# Patient Record
Sex: FEMALE | Race: BLACK OR AFRICAN AMERICAN | NOT HISPANIC OR LATINO | URBAN - METROPOLITAN AREA
[De-identification: names, ages, dates, MRNs, and addresses within clinical notes are randomized per-mention and may not be internally consistent; named-entity substitution may affect disease eponyms.]

---

## 2018-07-15 ENCOUNTER — EMERGENCY (EMERGENCY)
Facility: HOSPITAL | Age: 23
LOS: 1 days | Discharge: HOME | End: 2018-07-15
Attending: EMERGENCY MEDICINE | Admitting: EMERGENCY MEDICINE
Payer: MEDICAID

## 2018-07-15 VITALS
OXYGEN SATURATION: 100 % | TEMPERATURE: 98 F | HEART RATE: 88 BPM | DIASTOLIC BLOOD PRESSURE: 93 MMHG | RESPIRATION RATE: 16 BRPM | SYSTOLIC BLOOD PRESSURE: 136 MMHG

## 2018-07-15 PROCEDURE — 99283 EMERGENCY DEPT VISIT LOW MDM: CPT | Mod: 25

## 2018-07-15 RX ORDER — DEXAMETHASONE 0.5 MG/5ML
10 ELIXIR ORAL ONCE
Qty: 0 | Refills: 0 | Status: COMPLETED | OUTPATIENT
Start: 2018-07-15 | End: 2018-07-15

## 2018-07-15 RX ORDER — AMOXICILLIN 250 MG/5ML
1 SUSPENSION, RECONSTITUTED, ORAL (ML) ORAL
Qty: 14 | Refills: 0
Start: 2018-07-15 | End: 2018-07-21

## 2018-07-15 RX ORDER — AMOXICILLIN 250 MG/5ML
500 SUSPENSION, RECONSTITUTED, ORAL (ML) ORAL ONCE
Qty: 0 | Refills: 0 | Status: COMPLETED | OUTPATIENT
Start: 2018-07-15 | End: 2018-07-15

## 2018-07-15 RX ADMIN — Medication 10 MILLIGRAM(S): at 05:39

## 2018-07-15 RX ADMIN — Medication 500 MILLIGRAM(S): at 05:39

## 2018-07-15 NOTE — ED PROVIDER NOTE - PLAN OF CARE
Take medications as prescribed. If no improvement in 3 days, follow up with your healthcare provider about this issue (these issues): tonsillitis. Return if unable to swallow and tolerate liquids.

## 2018-07-15 NOTE — ED PROVIDER NOTE - CARE PLAN
Principal Discharge DX:	Tonsillitis Principal Discharge DX:	Tonsillitis  Assessment and plan of treatment:	Take medications as prescribed. If no improvement in 3 days, follow up with your healthcare provider about this issue (these issues): tonsillitis. Return if unable to swallow and tolerate liquids.

## 2018-07-15 NOTE — ED PROVIDER NOTE - ATTENDING CONTRIBUTION TO CARE
I performed a face-to-face evaluation of the patient and performed a history and physical examination. I agree with the history and physical examination.    Gary: Exudative tonsillitis. Steroids. PCN.

## 2018-07-15 NOTE — ED ADULT TRIAGE NOTE - CHIEF COMPLAINT QUOTE
Patient is having difficulty swallowing due sore throat, productive cough and decrease PO intake.  No fever or chills.  Experiencing lower back pain x 2 days.  No past medical history.

## 2018-07-15 NOTE — ED PROVIDER NOTE - MEDICAL DECISION MAKING DETAILS
Gary: Exudative tonsillitis. Steroids. PCN. Gary: Exudative tonsillitis. No STD risks. Steroids. PCN.

## 2018-07-15 NOTE — ED PROVIDER NOTE - OBJECTIVE STATEMENT
23F no PMH p/w sore throat x 4 days, hurts when swallowing. +cough. Denies nausea, vomiting, fever, dysuria, hematuria, sick contacts, myalgias.

## 2019-11-30 ENCOUNTER — TRANSCRIPTION ENCOUNTER (OUTPATIENT)
Age: 24
End: 2019-11-30

## 2020-05-18 ENCOUNTER — TRANSCRIPTION ENCOUNTER (OUTPATIENT)
Age: 25
End: 2020-05-18

## 2020-06-21 ENCOUNTER — TRANSCRIPTION ENCOUNTER (OUTPATIENT)
Age: 25
End: 2020-06-21

## 2020-08-17 ENCOUNTER — TRANSCRIPTION ENCOUNTER (OUTPATIENT)
Age: 25
End: 2020-08-17

## 2021-06-02 ENCOUNTER — EMERGENCY (EMERGENCY)
Facility: HOSPITAL | Age: 26
LOS: 0 days | Discharge: HOME | End: 2021-06-02
Payer: OTHER MISCELLANEOUS

## 2021-06-02 VITALS
DIASTOLIC BLOOD PRESSURE: 89 MMHG | HEART RATE: 81 BPM | OXYGEN SATURATION: 100 % | SYSTOLIC BLOOD PRESSURE: 137 MMHG | HEIGHT: 59 IN | RESPIRATION RATE: 18 BRPM | WEIGHT: 179.9 LBS | TEMPERATURE: 99 F

## 2021-06-02 VITALS
SYSTOLIC BLOOD PRESSURE: 135 MMHG | RESPIRATION RATE: 17 BRPM | HEART RATE: 81 BPM | DIASTOLIC BLOOD PRESSURE: 86 MMHG | OXYGEN SATURATION: 100 %
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
## 2021-06-02 DIAGNOSIS — S90.932A UNSPECIFIED SUPERFICIAL INJURY OF LEFT GREAT TOE, INITIAL ENCOUNTER: ICD-10-CM

## 2021-06-02 DIAGNOSIS — S91.312A LACERATION WITHOUT FOREIGN BODY, LEFT FOOT, INITIAL ENCOUNTER: ICD-10-CM
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
## 2021-06-02 DIAGNOSIS — Z23 ENCOUNTER FOR IMMUNIZATION: ICD-10-CM

## 2021-06-02 PROCEDURE — 73630 X-RAY EXAM OF FOOT: CPT | Mod: 26,LT

## 2021-06-02 PROCEDURE — 99283 EMERGENCY DEPT VISIT LOW MDM: CPT

## 2021-06-02 RX ORDER — OXYCODONE AND ACETAMINOPHEN 5; 325 MG/1; MG/1
1 TABLET ORAL ONCE
Refills: 0 | Status: DISCONTINUED | OUTPATIENT
Start: 2021-06-02 | End: 2021-06-02

## 2021-06-02 RX ORDER — IBUPROFEN 200 MG
1 TABLET ORAL
Qty: 12 | Refills: 0
Start: 2021-06-02 | End: 2021-06-05

## 2021-06-02 RX ORDER — TETANUS TOXOID, REDUCED DIPHTHERIA TOXOID AND ACELLULAR PERTUSSIS VACCINE, ADSORBED 5; 2.5; 8; 8; 2.5 [IU]/.5ML; [IU]/.5ML; UG/.5ML; UG/.5ML; UG/.5ML
0.5 SUSPENSION INTRAMUSCULAR ONCE
Refills: 0 | Status: COMPLETED | OUTPATIENT
Start: 2021-06-02 | End: 2021-06-02

## 2021-06-02 RX ADMIN — OXYCODONE AND ACETAMINOPHEN 1 TABLET(S): 5; 325 TABLET ORAL at 21:14

## 2021-06-02 RX ADMIN — Medication 1 TABLET(S): at 19:18

## 2021-06-02 RX ADMIN — TETANUS TOXOID, REDUCED DIPHTHERIA TOXOID AND ACELLULAR PERTUSSIS VACCINE, ADSORBED 0.5 MILLILITER(S): 5; 2.5; 8; 8; 2.5 SUSPENSION INTRAMUSCULAR at 19:19

## 2021-06-02 NOTE — ED PROVIDER NOTE - CARE PROVIDER_API CALL
Conchis Oseguera  FOOT AND ANKLE SURGERY  [address], [address]  [place] [address]  Phone: [phone]  Fax: [phone]  Follow Up Time:

## 2021-06-02 NOTE — CONSULT NOTE ADULT - SUBJECTIVE AND OBJECTIVE BOX
Podiatry pager #: [phone]/ [phone]    Patient is a 25y old  Female who presents with a chief complaint of LF pain.    HPI:    25F here with toe injury after dropping a dresser on her toe/foot today. She reports laceration and pain to the area. Last tetanus is unknown. She has been ambulatory.    PAST MEDICAL & SURGICAL HISTORY:  No pertinent past medical history    No significant past surgical history        MEDICATIONS  (STANDING):    MEDICATIONS  (PRN):      Allergies    No Known Drug Allergies  strawberry (Anaphylaxis)    Intolerances        VITALS:    Vital Signs Last 24 Hrs  T(C): 37.1 (02 Jun 2021 16:40), Max: 37.1 (02 Jun 2021 16:40)  T(F): 98.7 (02 Jun 2021 16:40), Max: 98.7 (02 Jun 2021 16:40)  HR: 81 (02 Jun 2021 16:40) (81 - 81)  BP: 137/89 (02 Jun 2021 16:40) (137/89 - 137/89)  BP(mean): --  RR: 18 (02 Jun 2021 16:40) (18 - 18)  SpO2: 100% (02 Jun 2021 16:40) (100% - 100%)    LABS:                CAPILLARY BLOOD GLUCOSE              LOWER EXTREMITY PHYSICAL EXAM:    Vasular: DP/PT 2/4, B/L, CFT <3 seconds B/L, Temperature gradient WNL, B/L.   Neuro: Epicritic sensation intact to the level of digits, B/L.  Musculoskeletal/Ortho: Left foot second digit laceration down to bone at the distal portion of digit, pain with palpation, minimal sanguinous drainage, no obvious contaminate.     Skin: laceration of left 2nd toe    RADIOLOGY & ADDITIONAL STUDIES:

## 2021-06-02 NOTE — CONSULT NOTE ADULT - ASSESSMENT
Pt is 26yo who presents w/ left 2nd toe open fracture  -pt was seen and examined  -Left foot second digit laceration down to bone at the distal portion of digit, pain with palpation, minimal sanguinous drainage, no obvious contaminate.   -LF xray- crush fracture to LF 2nd DP  -5cc of 1% lidocaine plain used to anesthesise the left 2nd digit. LF laceration thoroughly flushed w/ saline. Primary repair completed using 4-0 nylon  -Recommend 1 week of Augmentin  -To WBAT in a surgical shoe to the left heel  -Keep dressing clean dry and intact until follow up w/ Podiatrist within the week  -Please follow up w/ a podiatrist at [phone]  -Discussed w/ attending

## 2021-06-02 NOTE — ED PROVIDER NOTE - PATIENT PORTAL LINK FT
You can access the FollowMyHealth Patient Portal offered by [facility] by registering at the following website: [email or web address]. By joining [employer]’s FollowMyHealth portal, you will also be able to view your health information using other applications (apps) compatible with our system.

## 2021-06-02 NOTE — ED ADULT NURSE NOTE - NSIMPLEMENTINTERV_GEN_ALL_ED
Implemented All Universal Safety Interventions:  [place] to call system. Call bell, personal items and telephone within reach. Instruct patient to call for assistance. Room bathroom lighting operational. Non-slip footwear when patient is off stretcher. Physically safe environment: no spills, clutter or unnecessary equipment. Stretcher in lowest position, wheels locked, appropriate side rails in place.

## 2021-06-02 NOTE — ED PROVIDER NOTE - OBJECTIVE STATEMENT
25F here with toe injury after dropping a dresser on her toe/foot today. She reports laceration and pain to the area. Last tetanus is unknown. She has been ambulatory.

## 2021-06-02 NOTE — ED PROVIDER NOTE - CLINICAL SUMMARY MEDICAL DECISION MAKING FREE TEXT BOX
Patient presents with toe and nail laceration, recommend xray, tetanus, antibiotics, podiatry evaluation

## 2021-06-06 ENCOUNTER — EMERGENCY (EMERGENCY)
Facility: HOSPITAL | Age: 26
LOS: 0 days | Discharge: HOME | End: 2021-06-06
Payer: OTHER MISCELLANEOUS

## 2021-06-06 VITALS
OXYGEN SATURATION: 98 % | SYSTOLIC BLOOD PRESSURE: 122 MMHG | WEIGHT: 179.9 LBS | TEMPERATURE: 98 F | HEIGHT: 59 IN | DIASTOLIC BLOOD PRESSURE: 81 MMHG | HEART RATE: 81 BPM | RESPIRATION RATE: 17 BRPM

## 2021-06-06 DIAGNOSIS — M79.675 PAIN IN LEFT TOE(S): ICD-10-CM
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
## 2021-06-06 DIAGNOSIS — Z88.8 ALLERGY STATUS TO OTHER DRUGS, MEDICAMENTS AND BIOLOGICAL SUBSTANCES: ICD-10-CM

## 2021-06-06 DIAGNOSIS — T14.90XA INJURY, UNSPECIFIED, INITIAL ENCOUNTER: ICD-10-CM

## 2021-06-06 DIAGNOSIS — M79.672 PAIN IN LEFT FOOT: ICD-10-CM

## 2021-06-06 PROCEDURE — 99283 EMERGENCY DEPT VISIT LOW MDM: CPT

## 2021-06-06 RX ORDER — ACETAMINOPHEN 500 MG
650 TABLET ORAL ONCE
Refills: 0 | Status: COMPLETED | OUTPATIENT
Start: 2021-06-06 | End: 2021-06-06

## 2021-06-06 RX ORDER — BACITRACIN ZINC 500 UNIT/G
1 OINTMENT IN PACKET (EA) TOPICAL ONCE
Refills: 0 | Status: COMPLETED | OUTPATIENT
Start: 2021-06-06 | End: 2021-06-06

## 2021-06-06 RX ADMIN — Medication 650 MILLIGRAM(S): at 12:17

## 2021-06-06 RX ADMIN — Medication 1 APPLICATION(S): at 13:55

## 2021-06-06 NOTE — ED PROVIDER NOTE - PHYSICAL EXAMINATION
left toe 4th distal tip laceration with minimal localised swelling, TTP left 4th distal toe, no erythema, no signs of infection. dry not warm to touch. no  FROM, no sensory deficit, distal pulse intact, cap refill < 2 second, strength 5/5

## 2021-06-06 NOTE — ED ADULT NURSE NOTE - NSIMPLEMENTINTERV_GEN_ALL_ED
Implemented All Universal Safety Interventions:  [place] to call system. Call bell, personal items and telephone within reach. Instruct patient to call for assistance. Room bathroom lighting operational. Non-slip footwear when patient is off stretcher. Physically safe environment: no spills, clutter or unnecessary equipment. Stretcher in lowest position, wheels locked, appropriate side rails in place.

## 2021-06-06 NOTE — ED PROVIDER NOTE - CCCP TRG CHIEF CMPLNT
foot pain/injury Arava Counseling:  Patient counseled regarding adverse effects of Arava including but not limited to nausea, vomiting, abnormalities in liver function tests. Patients may develop mouth sores, rash, diarrhea, and abnormalities in blood counts. The patient understands that monitoring is required including LFTs and blood counts.  There is a rare possibility of scarring of the liver and lung problems that can occur when taking methotrexate. Persistent nausea, loss of appetite, pale stools, dark urine, cough, and shortness of breath should be reported immediately. Patient advised to discontinue Arava treatment and consult with a physician prior to attempting conception. The patient will have to undergo a treatment to eliminate Arava from the body prior to conception.

## 2021-06-06 NOTE — ED PROVIDER NOTE - NSFOLLOWUPCLINICS_GEN_ALL_ED_FT
[facility]  Podiatry  [address] [place] [address] - [address] [place] [address]  Phone: [phone]  Fax:   Follow Up Time: 7-10 Days

## 2021-06-06 NOTE — ED PROVIDER NOTE - NSFOLLOWUPINSTRUCTIONS_ED_ALL_ED_FT
Please schedule to follow up with podiatry for toe pain, toe fracture and laceration     [facility]  Podiatry  [address] [place] [address] [address] [place] [address]  Phone: [phone]  Fax:   Follow Up Time: 7-10 Days    please take tylenol 325 mg every 4 hours as needed for pain     please return to the ED for worsening symptoms chest pain, short of breath, fever, chills, cough, hemoptysis, numbness, weakness, drooling, blurry vision, headache, dizziness, bleeding, dysuria, frequency, urgency, rash, lesion, fall, injury, trauma, paresthesia, swelling, pain, discoloration,

## 2021-06-06 NOTE — ED ADULT NURSE NOTE - OBJECTIVE STATEMENT
patient c/o 8/ 1o left 2nd toe pain, sutures in place s/ p injury last week. Patient requesting extension of her work note until she is able to see the orthopedic.

## 2021-06-06 NOTE — ED PROVIDER NOTE - PROGRESS NOTE DETAILS
AAox3 non toxic looking afebrile sitting in chair in NAD. patient remains asymptomatic. bacitracin applied to toe, clean gauze applied, ace wrap place, podiatry shoe placed. patient instructed to keep schedule appointment with podiatry on 6/14/21 for toe fracture and laceration

## 2021-06-06 NOTE — ED PROVIDER NOTE - HIV OFFER
Refill requested:   Requested Prescriptions     Pending Prescriptions Disp Refills   • Blood Glucose Monitoring Suppl (TRUE METRIX METER) Does not apply Device 1 Device 0     Si Device by Other route 2 (two) times daily.  Dx E11.65   • TRUEPLUS LANCETS [facility]  [address] [facility]  [facility]  [address]  [address] [place] [employer], [employer] Previously Declined (within the last year)

## 2021-06-06 NOTE — ED ADULT TRIAGE NOTE - CHIEF COMPLAINT QUOTE
Pt c/o of left foot pain since last Wend. Pt has left foot  2nd digit fracture. Pt was seen in the ed last. Pt is supposed to be non-weight bearing but stated it is difficult to use the crutches.

## 2021-06-06 NOTE — ED PROVIDER NOTE - PATIENT PORTAL LINK FT
You can access the FollowMyHealth Patient Portal offered by [facility] by registering at the following website: [email or web address]. By joining [employer]’s FollowMyHealth portal, you will also be able to view your health information using other applications (apps) compatible with our system.

## 2021-06-06 NOTE — ED PROVIDER NOTE - OBJECTIVE STATEMENT
25 y.o female with PMhx left toe fracture and laceration presented to the ED with complaint of I need a note for work. admit to intermittetn sharp left toe pain, no radiation, worst with walking/standing, better with tylenol. Patient states " on 6/2/21 I was in the ED because a desk fell on my toe, I was seen here they told me I had a toe fracture, they gave me a note for work that finished today, my manager wanted me to return when I cant walk so I need another note". patient was seen in the ED lac repair was done, 6 suture placed on left toe, Augmentin given for laceration, patient reports taking antibx as prescribed. denies fall, injury, trauma, weakness, numbness, loc, syncope, swelling, discoloration, redness. patient has schedule appointment with podiatry on 6/14/21

## 2024-06-18 NOTE — ED PROVIDER NOTE - CARE PROVIDERS DIRECT ADDRESSES
Shar Pearson  Obstetrics and Gynecology  [address] [place] [id number]  Phone: [phone]  Fax: [phone]  Follow Up Time:    ,DirectAddress_Unknown